# Patient Record
Sex: FEMALE | NOT HISPANIC OR LATINO | Employment: FULL TIME | URBAN - METROPOLITAN AREA
[De-identification: names, ages, dates, MRNs, and addresses within clinical notes are randomized per-mention and may not be internally consistent; named-entity substitution may affect disease eponyms.]

---

## 2023-03-03 LAB — CHORIOGONADOTROPIN (MIU/ML) IN SER/PLAS: <2 MIU/ML

## 2023-08-02 LAB — PROGESTERONE (NG/ML) IN SER/PLAS: 11.9 NG/ML

## 2023-08-12 LAB
ESTRADIOL (PG/ML) IN SER/PLAS: 31 PG/ML
HEMATOCRIT (%) IN BLOOD BY AUTOMATED COUNT: 38.4 % (ref 36–46)

## 2023-08-16 LAB — ESTRADIOL (PG/ML) IN SER/PLAS: 85 PG/ML

## 2023-08-19 LAB — ESTRADIOL (PG/ML) IN SER/PLAS: 427 PG/ML

## 2023-08-21 LAB
ESTRADIOL (PG/ML) IN SER/PLAS: 880 PG/ML
PROGESTERONE (NG/ML) IN SER/PLAS: 0.6 NG/ML

## 2023-09-21 LAB — PROGESTERONE (NG/ML) IN SER/PLAS: 5.7 NG/ML

## 2023-10-05 ENCOUNTER — TELEPHONE (OUTPATIENT)
Dept: ENDOCRINOLOGY | Facility: CLINIC | Age: 38
End: 2023-10-05
Payer: COMMERCIAL

## 2023-10-05 NOTE — TELEPHONE ENCOUNTER
RN returned patient's call and spoke with Ruth. He reports patient started her menses today. Patient also experiencing weight gain, fatigue and hot flashes. RN explained these are normal side effects for the protocol Nour is currently on. Patient's spouse reviewed the rest of the plan and verbalized understanding.   Yecenia Espinoza RN 10/05/23 2:47 PM

## 2023-10-16 ENCOUNTER — PHARMACY VISIT (OUTPATIENT)
Dept: PHARMACY | Facility: CLINIC | Age: 38
End: 2023-10-16
Payer: COMMERCIAL

## 2023-10-16 PROCEDURE — RXMED WILLOW AMBULATORY MEDICATION CHARGE

## 2023-10-17 ENCOUNTER — SPECIALTY PHARMACY (OUTPATIENT)
Dept: PHARMACY | Facility: CLINIC | Age: 38
End: 2023-10-17

## 2023-10-18 ENCOUNTER — TELEPHONE (OUTPATIENT)
Dept: ENDOCRINOLOGY | Facility: CLINIC | Age: 38
End: 2023-10-18
Payer: COMMERCIAL

## 2023-10-19 NOTE — TELEPHONE ENCOUNTER
Patient would like to come into clinic for her Lupron injection for endometrial suppression protocol. Patient will come into clinic on Tuesday 10/24 at 12:15 for her injection.   Yecenia Espinoza RN 10/19/23 12:03 PM

## 2023-10-21 PROBLEM — N97.0 ANOVULATION: Status: ACTIVE | Noted: 2023-10-21

## 2023-10-21 PROBLEM — D25.1 INTRAMURAL LEIOMYOMA OF UTERUS: Status: ACTIVE | Noted: 2023-10-21

## 2023-10-21 PROBLEM — R89.9 ABNORMAL LABORATORY TEST RESULT: Status: ACTIVE | Noted: 2023-10-21

## 2023-10-21 PROBLEM — D21.9 FIBROID: Status: ACTIVE | Noted: 2023-10-21

## 2023-10-21 PROBLEM — R19.00 ABDOMINAL MASS: Status: ACTIVE | Noted: 2023-10-21

## 2023-10-21 PROBLEM — E55.9 VITAMIN D DEFICIENCY: Status: ACTIVE | Noted: 2023-10-21

## 2023-10-21 PROBLEM — E03.9 ACQUIRED HYPOTHYROIDISM: Status: ACTIVE | Noted: 2023-10-21

## 2023-10-21 PROBLEM — N92.6 IRREGULAR MENSES: Status: ACTIVE | Noted: 2023-10-21

## 2023-10-21 PROBLEM — N80.9 ENDOMETRIOSIS: Status: ACTIVE | Noted: 2023-10-21

## 2023-10-21 PROBLEM — N97.9 FEMALE INFERTILITY: Status: ACTIVE | Noted: 2023-10-21

## 2023-10-21 RX ORDER — ESTRADIOL 2 MG/1
1 TABLET ORAL 2 TIMES DAILY
COMMUNITY
Start: 2022-10-14

## 2023-10-21 RX ORDER — CHLORHEXIDINE GLUCONATE ORAL RINSE 1.2 MG/ML
SOLUTION DENTAL
COMMUNITY
Start: 2022-11-24

## 2023-10-21 RX ORDER — LEVOTHYROXINE SODIUM 75 UG/1
75 TABLET ORAL EVERY MORNING
COMMUNITY
End: 2024-04-09

## 2023-10-21 RX ORDER — FOLLITROPIN 300 [IU]/.36ML
150 INJECTION, SOLUTION SUBCUTANEOUS DAILY
COMMUNITY
Start: 2022-10-14

## 2023-10-21 RX ORDER — PROPYLENE GLYCOL/PEG 400 0.3 %-0.4%
DROPS OPHTHALMIC (EYE)
COMMUNITY
End: 2023-11-09 | Stop reason: SDUPTHER

## 2023-10-21 RX ORDER — CLOMIPHENE CITRATE 50 MG/1
2 TABLET ORAL
COMMUNITY
Start: 2022-10-14

## 2023-10-21 RX ORDER — MENOTROPINS 75 UNIT
75 KIT SUBCUTANEOUS DAILY
COMMUNITY
Start: 2022-10-14

## 2023-10-21 RX ORDER — LIDOCAINE AND PRILOCAINE 25; 25 MG/G; MG/G
CREAM TOPICAL
COMMUNITY
Start: 2023-02-16 | End: 2023-11-09 | Stop reason: SDUPTHER

## 2023-10-21 RX ORDER — FOLIC ACID 1 MG/1
1 TABLET ORAL DAILY
COMMUNITY

## 2023-10-21 RX ORDER — CHOLECALCIFEROL (VITAMIN D3) 50 MCG
1 TABLET ORAL DAILY
COMMUNITY

## 2023-10-21 RX ORDER — LEVOTHYROXINE SODIUM 50 UG/1
TABLET ORAL
COMMUNITY
Start: 2022-10-19 | End: 2024-04-09 | Stop reason: DRUGHIGH

## 2023-10-21 RX ORDER — PROGESTERONE 50 MG/ML
75 INJECTION, SOLUTION INTRAMUSCULAR DAILY
COMMUNITY
Start: 2023-02-16 | End: 2023-11-09 | Stop reason: SDUPTHER

## 2023-10-23 ENCOUNTER — SPECIALTY PHARMACY (OUTPATIENT)
Dept: PHARMACY | Facility: CLINIC | Age: 38
End: 2023-10-23

## 2023-10-24 ENCOUNTER — ANCILLARY PROCEDURE (OUTPATIENT)
Dept: ENDOCRINOLOGY | Facility: CLINIC | Age: 38
End: 2023-10-24
Payer: COMMERCIAL

## 2023-10-24 DIAGNOSIS — N80.9 ENDOMETRIOSIS: Primary | ICD-10-CM

## 2023-10-24 PROCEDURE — 2500000004 HC RX 250 GENERAL PHARMACY W/ HCPCS (ALT 636 FOR OP/ED): Mod: JZ,SE | Performed by: NURSE PRACTITIONER

## 2023-10-24 RX ADMIN — LEUPROLIDE ACETATE 3.75 MG: KIT at 12:22

## 2023-10-24 NOTE — PROGRESS NOTES
Pt brought medication to office for RN to administer IM injection. Order placed for in office IM injection. RN to be sure NEGATIVE UPT in office today prior to administration.   Reviewed and approved by KAT PITTS on 10/24/23 at 12:09 PM.     IM Depo Lupron given in office today. Patient brought own medication from home. See med administration for details. This is patient's second dose, therefore no need for a UPT in the office today per MAXWELL Pitts. NP. Patient tolerated injection well.  Nesha Huerta, RN

## 2023-11-09 ENCOUNTER — TELEPHONE (OUTPATIENT)
Dept: ENDOCRINOLOGY | Facility: CLINIC | Age: 38
End: 2023-11-09
Payer: COMMERCIAL

## 2023-11-09 DIAGNOSIS — N97.9 FEMALE INFERTILITY: ICD-10-CM

## 2023-11-09 DIAGNOSIS — Z31.83 ENCOUNTER FOR ASSISTED REPRODUCTIVE FERTILITY CYCLE: ICD-10-CM

## 2023-11-09 NOTE — TELEPHONE ENCOUNTER
Spoke to patient's  and told him that per Dr. Mendoza, patient my stop the suppression tabs a few days early.  She will plan on taking last tablets on 11/16 and start Estrace 6 mg daily on 11/17 and will still plan on tentative FET date on 12/12 with lining check on 12/4.  Will pend orders / meds to Dr. Mendoza now.  Patient verbalized understanding.   Kim Malik RN 11/09/23 4:31 PM      Erin Mendoza MD  You28 minutes ago (3:56 PM)         Yes that would be fine with me, thanks     You  Erin Mendoza MD1 hour ago (2:36 PM)       Toshia called- he said that Kamla is very depressed/angry and in general not feeling well on the suppression.  He would like to know if they could start the Estrace and stop the Aygestin/Letrozole a few days early?

## 2023-11-10 ENCOUNTER — TELEPHONE (OUTPATIENT)
Dept: ENDOCRINOLOGY | Facility: CLINIC | Age: 38
End: 2023-11-10
Payer: COMMERCIAL

## 2023-11-10 ENCOUNTER — PHARMACY VISIT (OUTPATIENT)
Dept: PHARMACY | Facility: CLINIC | Age: 38
End: 2023-11-10
Payer: COMMERCIAL

## 2023-11-10 DIAGNOSIS — N97.9 FEMALE INFERTILITY: ICD-10-CM

## 2023-11-10 PROCEDURE — RXMED WILLOW AMBULATORY MEDICATION CHARGE

## 2023-11-10 RX ORDER — PROPYLENE GLYCOL/PEG 400 0.3 %-0.4%
30 DROPS OPHTHALMIC (EYE) DAILY
Qty: 30 EACH | Refills: 2 | Status: SHIPPED | OUTPATIENT
Start: 2023-11-10

## 2023-11-10 RX ORDER — PROGESTERONE 50 MG/ML
75 INJECTION, SOLUTION INTRAMUSCULAR DAILY
Qty: 4 EACH | Refills: 2 | Status: SHIPPED | OUTPATIENT
Start: 2023-11-10

## 2023-11-10 RX ORDER — LIDOCAINE AND PRILOCAINE 25; 25 MG/G; MG/G
CREAM TOPICAL
Qty: 30 G | Refills: 2 | Status: SHIPPED | OUTPATIENT
Start: 2023-11-10

## 2023-11-10 RX ORDER — LIDOCAINE AND PRILOCAINE 25; 25 MG/G; MG/G
CREAM TOPICAL
Qty: 30 G | Refills: 2 | Status: SHIPPED | OUTPATIENT
Start: 2023-11-10 | End: 2023-12-09

## 2023-11-10 RX ORDER — ESTRADIOL 2 MG/1
6 TABLET ORAL DAILY
Qty: 90 TABLET | Refills: 2 | Status: SHIPPED | OUTPATIENT
Start: 2023-11-10 | End: 2023-11-10 | Stop reason: SDUPTHER

## 2023-11-10 RX ORDER — PROGESTERONE 50 MG/ML
75 INJECTION, SOLUTION INTRAMUSCULAR DAILY
Qty: 5 EACH | Refills: 2 | Status: SHIPPED | OUTPATIENT
Start: 2023-11-10 | End: 2023-11-10 | Stop reason: SDUPTHER

## 2023-11-10 RX ORDER — ESTRADIOL 2 MG/1
6 TABLET ORAL DAILY
Qty: 90 TABLET | Refills: 2 | Status: SHIPPED | OUTPATIENT
Start: 2023-11-10 | End: 2024-02-08

## 2023-11-10 RX ORDER — PROGESTERONE 50 MG/ML
75 INJECTION, SOLUTION INTRAMUSCULAR DAILY
Qty: 50 ML | Refills: 2 | Status: SHIPPED | OUTPATIENT
Start: 2023-11-10 | End: 2024-02-08

## 2023-11-10 RX ORDER — LIDOCAINE AND PRILOCAINE 25; 25 MG/G; MG/G
CREAM TOPICAL
Qty: 1 G | Refills: 2 | Status: SHIPPED | OUTPATIENT
Start: 2023-11-10 | End: 2023-11-10 | Stop reason: SDUPTHER

## 2023-11-10 RX ORDER — PROPYLENE GLYCOL/PEG 400 0.3 %-0.4%
30 DROPS OPHTHALMIC (EYE) DAILY
Qty: 30 EACH | Refills: 2 | Status: SHIPPED | OUTPATIENT
Start: 2023-11-10 | End: 2023-11-10 | Stop reason: SDUPTHER

## 2023-11-10 RX ORDER — PROPYLENE GLYCOL/PEG 400 0.3 %-0.4%
DROPS OPHTHALMIC (EYE)
Qty: 30 EACH | Refills: 3 | Status: SHIPPED | OUTPATIENT
Start: 2023-11-10 | End: 2024-02-07

## 2023-11-10 NOTE — TELEPHONE ENCOUNTER
Pharmacy needs clarification as they received duplicates of the scripts; lidocaine cream, transparent dressing, progesterone oil, estradiol

## 2023-11-10 NOTE — TELEPHONE ENCOUNTER
Contacted Cristy Huddleston from [facility] and requested reroute of all FET meds to [facility].  Kim Malik RN 11/10/23 2:48 PM

## 2023-11-13 ENCOUNTER — SPECIALTY PHARMACY (OUTPATIENT)
Dept: PHARMACY | Facility: CLINIC | Age: 38
End: 2023-11-13

## 2023-11-17 NOTE — PROGRESS NOTES
FET set up after suppression protocol.     LMP: s/p suppression protocol  Plan: Programmed FET with TERI 75 mg and baby ASA 81 mg to start with FET   Tentative lining check: 12/4/23  Tentative progesterone start: 12/7/23  Tentative transfer date: 12/12/23  Number of days of progesterone for transfer: 6  Treatment plan: Sent 11/17/23  Embryo # confirmed: 1 SC   Embryo # to transfer: 1     Reviewed and approved by JEANA CARMONA on 11/17/23 at 1:19 PM.    Called patient's , Ruth, to verify that Kamla will start her estrace tablets today (3 tabs daily) and also informed him that they will need to complete the FET treatment plan ASAP via [employer].  Patient verbalized understanding.   Kim Malik RN 11/17/23 2:40 PM

## 2023-12-04 ENCOUNTER — ANCILLARY PROCEDURE (OUTPATIENT)
Dept: ENDOCRINOLOGY | Facility: CLINIC | Age: 38
End: 2023-12-04
Payer: COMMERCIAL

## 2023-12-04 DIAGNOSIS — N97.9 FEMALE INFERTILITY: ICD-10-CM

## 2023-12-04 LAB
ESTRADIOL SERPL-MCNC: 277 PG/ML
PROGEST SERPL-MCNC: 0.5 NG/ML

## 2023-12-04 PROCEDURE — 84144 ASSAY OF PROGESTERONE: CPT

## 2023-12-04 PROCEDURE — 36415 COLL VENOUS BLD VENIPUNCTURE: CPT

## 2023-12-04 PROCEDURE — 76857 US EXAM PELVIC LIMITED: CPT | Performed by: OBSTETRICS & GYNECOLOGY

## 2023-12-04 PROCEDURE — 76857 US EXAM PELVIC LIMITED: CPT

## 2023-12-04 PROCEDURE — 82670 ASSAY OF TOTAL ESTRADIOL: CPT

## 2023-12-04 RX ORDER — ESTRADIOL 2 MG/1
2 TABLET ORAL 2 TIMES DAILY
Qty: 60 TABLET | Refills: 3
Start: 2023-12-04 | End: 2024-12-03

## 2023-12-04 NOTE — PROGRESS NOTES
Lining Check    Patient reviewed at team meeting to set up frozen embryo transfer.  Medication protocol in LUIZ cycles tab  Lining check:  12/4/2023  RN reviewed TERI administration, marked patient's sites and provided handout.   TERI start: 12/7/2023  Tentative FET date:  12/12/2023  Plan to be communicated to patient by treatment team.     Yecenia Espinoza  12/04/2023  9:41 AM    [facility]  Ultrasound and/or labs reviewed at [facility].   Results for orders placed or performed in visit on 12/04/23 (from the past 96 hour(s))   Estradiol   Result Value Ref Range    Estradiol 277 pg/mL   Progesterone   Result Value Ref Range    Progesterone 0.5 ng/mL     Mon 12/4 (Day 18)   estradiol tablet: Take 6 mg once daily by mouth   estradiol tablet: Insert 2 mg 2 times a day into the vagina    Tue 12/5 (Day 19)   estradiol tablet: Take 6 mg once daily by mouth   estradiol tablet: Insert 2 mg 2 times a day into the vagina    RTC in two days for Lining check and Estradiol and Progesterone.   Erin Mendoza  12/04/2023  1:16 PM    RN called patient with her plan and spoke with her  Qian. RN reviewed that patient will continue to take estradiol 6mg PO daily, but patient will add estradiol 2mg BID vaginally. Patient's  verbalized understanding. Patient will return to clinic on Wednesday for a second lining check. RN transferred call to the  so patient can schedule for Wednesday.   Yecenia Espinoza RN 12/04/23 1:49 PM

## 2023-12-06 ENCOUNTER — ANCILLARY PROCEDURE (OUTPATIENT)
Dept: ENDOCRINOLOGY | Facility: CLINIC | Age: 38
End: 2023-12-06
Payer: COMMERCIAL

## 2023-12-06 DIAGNOSIS — N97.9 FEMALE INFERTILITY: ICD-10-CM

## 2023-12-06 LAB
ESTRADIOL SERPL-MCNC: 511 PG/ML
PROGEST SERPL-MCNC: 0.4 NG/ML

## 2023-12-06 PROCEDURE — 84144 ASSAY OF PROGESTERONE: CPT

## 2023-12-06 PROCEDURE — 76857 US EXAM PELVIC LIMITED: CPT

## 2023-12-06 PROCEDURE — 82670 ASSAY OF TOTAL ESTRADIOL: CPT

## 2023-12-06 PROCEDURE — 36415 COLL VENOUS BLD VENIPUNCTURE: CPT

## 2023-12-06 NOTE — PROGRESS NOTES
CYCLING NOTE    Here for US and/or lab monitoring; relevant findings reviewed. Patient is here for second lining check for programmed FET. Had added PV estrace on Monday, returning today for re-check. Patient to start progesterone injections 75mg tomorrow 12/7. Patient to start 81mg ASA with embryo transfer on 12/12.    Patient stayed for nurse visit. Pain is 0/10 -- Progesterone teaching already completed on Monday.   Team will contact patient later today with results and plan.    BRANDON DE LA PAZ  12/06/2023  9:15 AM      HUDDLE PROVIDER NOTE  Ultrasound and/or labs reviewed at [facility].   Results for orders placed or performed in visit on 12/06/23 (from the past 96 hour(s))   Estradiol   Result Value Ref Range    Estradiol 511 pg/mL   Progesterone   Result Value Ref Range    Progesterone 0.4 ng/mL     Wed 12/6 (Day 20)   estradiol tablet: Take 6 mg once daily by mouth   estradiol tablet: Insert 2 mg 2 times a day into the vagina    Thu 12/7 (Day 21)   progesterone injection: Inject 75 mg once daily into the muscle   estradiol tablet: Take 6 mg once daily by mouth    Fri 12/8 (Day 22)   progesterone injection: Inject 75 mg once daily into the muscle   estradiol tablet: Take 6 mg once daily by mouth    Sat 12/9 (Day 23)   progesterone injection: Inject 75 mg once daily into the muscle   estradiol tablet: Take 6 mg once daily by mouth    Sun 12/10 (Day 24)   progesterone injection: Inject 75 mg once daily into the muscle   estradiol tablet: Take 6 mg once daily by mouth    Mon 12/11 (Day 25)   progesterone injection: Inject 75 mg once daily into the muscle   estradiol tablet: Take 6 mg once daily by mouth    Tue 12/12 (Day 26)   progesterone injection: Inject 75 mg once daily into the muscle   estradiol tablet: Take 6 mg once daily by mouth    RTC in  12/12/2023  for  embryo transfer  and Progesterone.   Erin Mendoza  12/06/2023  1:00 PM      Placed call to patient/ patient's  to relay plan as listed  above. Patient is aware to discontinue vaginal estrace after today when she starts the progesterone injections tomorrow. Relayed that patient is to start 81mg ASA with transfer as well. Patient verbalized understanding of plan, they are aware that the lab will confirm what time to be here for the transfer the day before. No further questions at this time.   BRANDON DE LA PAZ RN 12/06/2023 13:55

## 2023-12-12 ENCOUNTER — PREP FOR PROCEDURE (OUTPATIENT)
Dept: ENDOCRINOLOGY | Facility: CLINIC | Age: 38
End: 2023-12-12
Payer: COMMERCIAL

## 2023-12-12 ENCOUNTER — HOSPITAL ENCOUNTER (OUTPATIENT)
Dept: ENDOCRINOLOGY | Facility: CLINIC | Age: 38
Discharge: HOME | End: 2023-12-12
Payer: COMMERCIAL

## 2023-12-12 ENCOUNTER — TELEPHONE (OUTPATIENT)
Dept: ENDOCRINOLOGY | Facility: CLINIC | Age: 38
End: 2023-12-12
Payer: COMMERCIAL

## 2023-12-12 DIAGNOSIS — Z31.83 ENCOUNTER FOR ASSISTED REPRODUCTIVE FERTILITY CYCLE: ICD-10-CM

## 2023-12-12 DIAGNOSIS — N97.9 FEMALE INFERTILITY: ICD-10-CM

## 2023-12-12 DIAGNOSIS — Z32.00 UNCONFIRMED PREGNANCY: Primary | ICD-10-CM

## 2023-12-12 LAB — PROGEST SERPL-MCNC: 39 NG/ML

## 2023-12-12 PROCEDURE — 89255 PREPARE EMBRYO FOR TRANSFER: CPT | Performed by: OBSTETRICS & GYNECOLOGY

## 2023-12-12 PROCEDURE — 89253 EMBRYO HATCHING: CPT | Performed by: OBSTETRICS & GYNECOLOGY

## 2023-12-12 PROCEDURE — 84144 ASSAY OF PROGESTERONE: CPT | Performed by: OBSTETRICS & GYNECOLOGY

## 2023-12-12 PROCEDURE — 89352 THAWING CRYOPRESRVED EMBRYO: CPT | Performed by: OBSTETRICS & GYNECOLOGY

## 2023-12-12 PROCEDURE — 36415 COLL VENOUS BLD VENIPUNCTURE: CPT | Performed by: OBSTETRICS & GYNECOLOGY

## 2023-12-12 PROCEDURE — 58974 EMBRYO TRANSFER INTRAUTERINE: CPT | Performed by: OBSTETRICS & GYNECOLOGY

## 2023-12-12 PROCEDURE — 36415 COLL VENOUS BLD VENIPUNCTURE: CPT

## 2023-12-12 PROCEDURE — 84144 ASSAY OF PROGESTERONE: CPT

## 2023-12-12 RX ORDER — ACETAMINOPHEN 325 MG/1
650 TABLET ORAL ONCE AS NEEDED
Status: DISCONTINUED | OUTPATIENT
Start: 2023-12-12 | End: 2023-12-13 | Stop reason: HOSPADM

## 2023-12-12 RX ORDER — ACETAMINOPHEN 325 MG/1
650 TABLET ORAL ONCE AS NEEDED
Status: CANCELLED | OUTPATIENT
Start: 2023-12-12

## 2023-12-12 NOTE — DISCHARGE INSTRUCTIONS
[facility]  [address]. [place]  [address]  Tel: [phone]   Fax: [phone]    Home Going Instructions after Embryo Transfer:     After completing your embryo transfer please treat your body as though you are pregnant.  No smoking, alcohol, or recreational drugs.  Make sure you are taking a prenatal vitamin daily.   Continue all medications currently prescribed for embryo transfer until otherwise instructed by your physician, even if you experience spotting or bleeding and even if you have a negative home pregnancy test.   Medications to avoid in pregnancy: Advil, Motrin, Ibuprofen, Aleve, Excedrin, [place], Sudafed, and Pepto-Bismol. Avoid aspirin unless you are taking this daily at the direction of your physician.   Medications that are generally safe in pregnancy: Tylenol, Benadryl, Plain Robitussin, Zyrtec, Claritin, Pepcid, Tums, Rolaids, Mylanta, Nasonex.   Foods to avoid:   Seafood that is high in mercury; you can have canned tuna twice a week.   Deli meats, deli salads, hot dogs, and unpasteurized cheeses due to the risk of Listeria.  Activities to avoid:   No hot tubs, whirlpools, or saunas.  Avoid starting new exercise routines that you have not done previously.  Avoid lifting > 30 lbs.  No cleaning litter boxes.  No intercourse until you test for pregnancy.   You do not need to be on bed rest following the transfer. It is healthy, normal, and recommended to go about routine physical activity.   We advise against taking a home pregnancy test due to the possibility of false negative and false positive results.   You will test for pregnancy in approximately 10 days, at which point you will be about 4 weeks pregnant.   If blood work was drawn on the day of your transfer, you can expect a phone that day with the results of your bloodwork. We expect a progesterone level greater than or equal to 16. If you level is less than 16  we will adjust your progesterone dose and repeat your level in 2 days.     Rashmi Dyson    11:02 AM

## 2023-12-12 NOTE — TELEPHONE ENCOUNTER
Spoke with patient regarding level today at 13:49, told to continue all medications as ordered per Dr. Mendoza.  Patient was understanding and is agreeable.  Progesterone   Date Value Ref Range Status   12/12/2023 39.0 ng/mL Final       Rashmi Dyson RN

## 2023-12-12 NOTE — PROGRESS NOTES
Patient ID: Kamla Cota is a 38 y.o. female.    Embryo Transfer    Date/Time: 12/12/2023 12:33 PM    Performed by: Erin Mendoza MD  Authorized by: Erin Mendoza MD    Consent:     Consent obtained:  Written    Consent given by:  Patient    Procedure risks and benefits discussed: yes      Patient questions answered: yes      Patient agrees, verbalizes understanding, and wants to proceed: yes      Educational handouts given: yes      Instructions and paperwork completed: yes    Procedure:     Pelvic exam performed: No      Cervix cleaned and prepped: Yes      Speculum placed in vagina: Yes      Ultrasound guidance: Yes      Speculum type: Nolvia (long)      Catheter type:  Sure View Reese    Uterine position:  Retroverted    Bladder backfilling performed: No, bladder sufficiently full      Uterine visualization complete: Yes      Difficulty: easy      Estimated blood loss:  None    Embryos transferred:  1    Catheter navigation notes:  23 cm catheter easy with afterload  Used long nolvia speculum to find cervix- very anterior  Post-procedure:     Patient tolerated procedure well: yes    Comments:      Preop diagnosis: Infertility  Post op diagnosis: Same  Assistant: none  Depth: ~15 cm  Curve: 30 deg  Distance from fundus: 14 mm  Embryo stage at day of transfer: day 6  Embryo notes: BB   Additional Notes:  Anesthesia: None    IV Fluids: None  UOP: Not recorded  Specimens: None  Complications: None  This replaces an operative report.

## 2023-12-14 ENCOUNTER — PHARMACY VISIT (OUTPATIENT)
Dept: PHARMACY | Facility: CLINIC | Age: 38
End: 2023-12-14
Payer: MEDICAID

## 2023-12-14 ENCOUNTER — SPECIALTY PHARMACY (OUTPATIENT)
Dept: PHARMACY | Facility: CLINIC | Age: 38
End: 2023-12-14

## 2023-12-14 PROCEDURE — RXMED WILLOW AMBULATORY MEDICATION CHARGE

## 2023-12-22 ENCOUNTER — ANCILLARY PROCEDURE (OUTPATIENT)
Dept: ENDOCRINOLOGY | Facility: CLINIC | Age: 38
End: 2023-12-22
Payer: COMMERCIAL

## 2023-12-22 DIAGNOSIS — E03.8 OTHER SPECIFIED HYPOTHYROIDISM: Primary | ICD-10-CM

## 2023-12-22 DIAGNOSIS — Z32.00 UNCONFIRMED PREGNANCY: ICD-10-CM

## 2023-12-22 LAB — B-HCG SERPL-ACNC: 49 MIU/ML

## 2023-12-22 PROCEDURE — 84702 CHORIONIC GONADOTROPIN TEST: CPT

## 2023-12-22 PROCEDURE — 36415 COLL VENOUS BLD VENIPUNCTURE: CPT

## 2023-12-22 NOTE — PROGRESS NOTES
Component      Latest Ref Rng 2023   HCG, Beta-Quantitative      <5 mIU/mL 49 (H)         HCG s/p FET on 23.  Kim Malik RN 23 10:28 AM      Patient's LUI[facility] Provider: Erin Mendoza MD  Infertility dx: Male infertility, History of Endometriosis, and Uterine  Achieved pregnancy by: Embryo transfer  EGA based on (IUI date, ET ): embryo transfer date 4w 1d based on FET of     Updated G/P with this pregnancy:       Type & Screen: A+ Ab NEG  History of miscarriage: No  History of pelvic/abdominal surgeries: No  History of STDs/PID: No  Hx of ectopic: No  Hx of tubal disease/ blockage: No    Risk for ectopic:     Labs ordered/Plan:   CG ordered. Team will reach out to patient with results and plan.   Discussed early pregnancy versus miscarriage versus ectopic. Precautions given.   Patient expresses understanding of plan and is agreeable.    Kim Malik  2023  12:02 PM    HUDDLE PROVIDER NOTE - HCG REVIEW  [facility]     Results for orders placed or performed in visit on 23 (from the past 96 hour(s))   hCG, quantitative, pregnancy   Result Value Ref Range    HCG, Beta-Quantitative 49 (H) <5 mIU/mL       Lab Results   Component Value Date    HCGQUANT 49 (H) 2023    HCGQUANT <2 2023       RTC in four days for LABS ONLY VISIT and HCG.   Mazin Padilla  2023  12:36 PM    Discussed HCG level with patient, will recheck Tuesday along with a TSH as he states she is on Synthroid, she will continue all FET meds plus baby ASA daily.  Discussed that this level is lower than what we would like to see at this stage in pregnancy.  Kim Malik RN 23 1:25 PM

## 2023-12-26 ENCOUNTER — ANCILLARY PROCEDURE (OUTPATIENT)
Dept: ENDOCRINOLOGY | Facility: CLINIC | Age: 38
End: 2023-12-26
Payer: COMMERCIAL

## 2023-12-26 ENCOUNTER — TELEPHONE (OUTPATIENT)
Dept: ENDOCRINOLOGY | Facility: CLINIC | Age: 38
End: 2023-12-26

## 2023-12-26 DIAGNOSIS — E03.8 OTHER SPECIFIED HYPOTHYROIDISM: ICD-10-CM

## 2023-12-26 DIAGNOSIS — Z32.00 UNCONFIRMED PREGNANCY: ICD-10-CM

## 2023-12-26 DIAGNOSIS — O36.80X0 ENCOUNTER TO DETERMINE FETAL VIABILITY OF PREGNANCY, NOT APPLICABLE OR UNSPECIFIED FETUS (HHS-HCC): ICD-10-CM

## 2023-12-26 LAB
B-HCG SERPL-ACNC: 83 MIU/ML
TSH SERPL-ACNC: 2.78 MIU/L (ref 0.44–3.98)

## 2023-12-26 PROCEDURE — 84443 ASSAY THYROID STIM HORMONE: CPT

## 2023-12-26 PROCEDURE — 84702 CHORIONIC GONADOTROPIN TEST: CPT

## 2023-12-26 PROCEDURE — 36415 COLL VENOUS BLD VENIPUNCTURE: CPT

## 2023-12-26 NOTE — PROGRESS NOTES
Repeat BHCG. EGA based on FET date 23: 4w5d today  Component      Latest Ref Rng 2023   HCG, Beta-Quantitative      <5 mIU/mL 49 (H)  83 (H)       Component      Latest Ref Rng 2023   Thyroid Stimulating Hormone      0.44 - 3.98 mIU/L 2.78      HCG s/p FET on 23.       Patient's [facility] Provider: Erin Mendoza MD  Infertility dx: Male infertility, History of Endometriosis, and Uterine  Achieved pregnancy by: Embryo transfer     Updated G/P with this pregnancy:       Type & Screen: A+ Ab NEG  History of miscarriage: No  History of pelvic/abdominal surgeries: No  History of STDs/PID: No  Hx of ectopic: No  Hx of tubal disease/ blockage: No     Risk for ectopic: no     Will call pt with results and plan.    ALL VASQUES on 23 at 1:01 PM.     [facility] PROVIDER NOTE - HCG REVIEW  Ultrasound and/or labs reviewed at [facility].     Results for orders placed or performed in visit on 23 (from the past 96 hour(s))   (Lab Collect) Human Chorionic Gonadotropin, Serum Quantitative   Result Value Ref Range    HCG, Beta-Quantitative 83 (H) <5 mIU/mL   TSH with reflex to Free T4 if abnormal   Result Value Ref Range    Thyroid Stimulating Hormone 2.78 0.44 - 3.98 mIU/L       Lab Results   Component Value Date    HCGQUANT 83 (H) 2023    HCGQUANT 49 (H) 2023    HCGQUANT <2 2023    HCGQUANT 9,581 (A) 2019    HCGQUANT 525 (A) 2019    HCGQUANT <2 2019       RTC in two days for LABS ONLY VISIT and HCG.   Erin Mendoza  2023  1:11 PM    Called pts  Qian to discuss results and plan. WE reviewed the minimum rise for BHCG levels is 53% in 48 hours. HE states he thinks we checked it too early and should be 14 days from FET. I explained timing and GA of pregnancy with IUI/ovulation/LMP vs FET. We discussed the plan for now is to continue all meds at current doses and repeat bhcg on , however, if bhcg level  does not rise minimum,  we will likely discontinue meds as it will mean the pregnancy is not viable. We reviewed all meds and dosages she is on. WE discussed that TSH=2.78, just slightly above where we would like it, and he confirmed that Kamla takes 75 mcg levothyroxine daily. I told him we will discuss results 12/28 and based on bhcg level at that time decide if she needs to increase levothyroxine dose. He verbalized understanding and is agreeable.   ALL VASQUES on 12/26/23 at 2:37 PM.

## 2023-12-28 ENCOUNTER — ANCILLARY PROCEDURE (OUTPATIENT)
Dept: ENDOCRINOLOGY | Facility: CLINIC | Age: 38
End: 2023-12-28
Payer: COMMERCIAL

## 2023-12-28 ENCOUNTER — DOCUMENTATION (OUTPATIENT)
Dept: ENDOCRINOLOGY | Facility: CLINIC | Age: 38
End: 2023-12-28

## 2023-12-28 DIAGNOSIS — O02.81 INAPPROPRIATE CHANGE IN QUANTITATIVE HCG IN EARLY PREGNANCY (HHS-HCC): Primary | ICD-10-CM

## 2023-12-28 DIAGNOSIS — O36.80X0 ENCOUNTER TO DETERMINE FETAL VIABILITY OF PREGNANCY, NOT APPLICABLE OR UNSPECIFIED FETUS (HHS-HCC): ICD-10-CM

## 2023-12-28 LAB — B-HCG SERPL-ACNC: 89 MIU/ML

## 2023-12-28 PROCEDURE — 36415 COLL VENOUS BLD VENIPUNCTURE: CPT

## 2023-12-28 PROCEDURE — 84702 CHORIONIC GONADOTROPIN TEST: CPT

## 2023-12-28 NOTE — PROGRESS NOTES
Component      Latest Ref Rng 12/22/2023 12/26/2023 12/28/2023   HCG, Beta-Quantitative      <5 mIU/mL 49 (H)  83 (H)  89 (H)        Here for lab monitoring, patient and  have questions re: HCG levels.  All questions answered, they are aware that HCG levels thus far are not appropriate, they are hopeful.      Patient stayed for nurse visit. Pain is 0/10  Team will contact patient later today with results and plan.    Kim Malik  12/28/2023  10:08 AM    HUDDLE PROVIDER NOTE - HCG REVIEW  Ultrasound and/or labs reviewed at [facility].     Results for orders placed or performed in visit on 12/28/23 (from the past 96 hour(s))   (Lab Collect) Human Chorionic Gonadotropin, Serum Quantitative   Result Value Ref Range    HCG, Beta-Quantitative 89 (H) <5 mIU/mL       Lab Results   Component Value Date    HCGQUANT 89 (H) 12/28/2023    HCGQUANT 83 (H) 12/26/2023    HCGQUANT 49 (H) 12/22/2023    HCGQUANT <2 03/03/2023    HCGQUANT 9,581 (A) 09/11/2019    HCGQUANT 525 (A) 09/04/2019    HCGQUANT <2 08/02/2019       Stop  progesterone and estrace and  RTC in two days for LABS ONLY VISIT and HCG.   Erin Mendoza  12/28/2023  12:44 PM    Spoke to patient's  Lulu, he was instructed to have Nour stop all medicines and go to [facility] lab on Saturday morning at 0800 for repeat level.  Ectopic and bleeding precautions given.  He verbalized understanding.  He was encouraged to call office and have answering service page fellow if needed.  He would also like to speak w Dr. Mendoza today- will send her a message.  Kim Malik RN 12/28/23 1:03 PM

## 2023-12-28 NOTE — PROGRESS NOTES
Called patient and  to review biochemical pregnancy after FET.    She did IVF cycle and had 1 blast frozen    FET--> hCG rising abnormally, will stop meds and come for repeat level in 2 days    We reviewed the cycle and recommendations for proceeding  They will likely not pursue further IVF treatments at this toma.  Offered follow up visit if patient desires.    JEANA CARMONA on 12/28/23 at 4:46 PM.

## 2023-12-30 ENCOUNTER — ANCILLARY PROCEDURE (OUTPATIENT)
Dept: ENDOCRINOLOGY | Facility: CLINIC | Age: 38
End: 2023-12-30
Payer: COMMERCIAL

## 2023-12-30 ENCOUNTER — LAB (OUTPATIENT)
Dept: LAB | Facility: LAB | Age: 38
End: 2023-12-30
Payer: COMMERCIAL

## 2023-12-30 DIAGNOSIS — O02.81 BIOCHEMICAL PREGNANCY (HHS-HCC): Primary | ICD-10-CM

## 2023-12-30 DIAGNOSIS — N97.9 FEMALE INFERTILITY: ICD-10-CM

## 2023-12-30 DIAGNOSIS — O02.81 INAPPROPRIATE CHANGE IN QUANTITATIVE HCG IN EARLY PREGNANCY (HHS-HCC): ICD-10-CM

## 2023-12-30 LAB
B-HCG SERPL-ACNC: 68 MIU/ML
ESTRADIOL SERPL-MCNC: 127 PG/ML

## 2023-12-30 PROCEDURE — 36415 COLL VENOUS BLD VENIPUNCTURE: CPT

## 2023-12-30 PROCEDURE — 82670 ASSAY OF TOTAL ESTRADIOL: CPT

## 2023-12-30 PROCEDURE — 84702 CHORIONIC GONADOTROPIN TEST: CPT

## 2023-12-30 NOTE — PROGRESS NOTES
[facility]DLE PROVIDER NOTE  [facility]   Results for orders placed or performed in visit on 12/28/23 (from the past 96 hour(s))   (Lab Collect) Human Chorionic Gonadotropin, Serum Quantitative   Result Value Ref Range    HCG, Beta-Quantitative 89 (H) <5 mIU/mL     Repeat hCG today resulted at 68, down from 89. Called patient and discussed plan with . He will call on Tuesday to schedule blood draw the following week (1/9/24).    RTC in TEN DAYS for LABS ONLY VISIT and HCG.   Discussed with Dr. Alondra Acuna  12/30/2023  11:37 AM

## 2024-01-09 ENCOUNTER — ANCILLARY PROCEDURE (OUTPATIENT)
Dept: ENDOCRINOLOGY | Facility: CLINIC | Age: 39
End: 2024-01-09
Payer: COMMERCIAL

## 2024-01-09 DIAGNOSIS — O02.81 BIOCHEMICAL PREGNANCY (HHS-HCC): ICD-10-CM

## 2024-01-09 LAB — B-HCG SERPL-ACNC: <2 MIU/ML

## 2024-01-09 PROCEDURE — 84702 CHORIONIC GONADOTROPIN TEST: CPT

## 2024-01-09 PROCEDURE — 36415 COLL VENOUS BLD VENIPUNCTURE: CPT

## 2024-01-09 NOTE — PROGRESS NOTES
Patient here for repeat HCG today. Is s/p FET on 12/12/23 with biochemical pregnancy.  ANGEL BLANCO on 1/9/24 at 10:48 AM.       [facility]  Ultrasound and/or labs reviewed at [facility] [place] [facility].     Results for orders placed or performed in visit on 01/09/24 (from the past 96 hour(s))   hCG, quantitative, pregnancy   Result Value Ref Range    HCG, Beta-Quantitative <2 <5 mIU/mL       Lab Results   Component Value Date    HCGQUANT <2 01/09/2024    HCGQUANT 68 (H) 12/30/2023    HCGQUANT 89 (H) 12/28/2023    HCGQUANT 83 (H) 12/26/2023    HCGQUANT 49 (H) 12/22/2023    HCGQUANT <2 03/03/2023    HCGQUANT 9,581 (A) 09/11/2019    HCGQUANT 525 (A) 09/04/2019       Can stop testing as hCG is negative.   Erin Mendoza  01/09/2024  1:44 PM    Phone call to patient's , reviewed plan as noted above, he verbalizes understanding at this time and denies additional questions.  ANGEL BLANCO on 1/9/24 at 1:58 PM.

## 2024-04-09 DIAGNOSIS — E03.9 ACQUIRED HYPOTHYROIDISM: Primary | ICD-10-CM

## 2024-04-09 RX ORDER — LEVOTHYROXINE SODIUM 75 UG/1
75 TABLET ORAL
Qty: 90 TABLET | Refills: 3 | Status: SHIPPED | OUTPATIENT
Start: 2024-04-09

## 2024-04-09 NOTE — TELEPHONE ENCOUNTER
Pt last seen 04/26/2023 / Spoke to pt  Annual sched for 07/23/2024 / last tsh 12/26/2023 results 2.78   wanting to know if tsg needs to be done prior to 7/2024 appt .

## 2024-07-23 ENCOUNTER — HOSPITAL ENCOUNTER (OUTPATIENT)
Dept: RADIOLOGY | Facility: CLINIC | Age: 39
Discharge: HOME | End: 2024-07-23
Payer: COMMERCIAL

## 2024-07-23 ENCOUNTER — LAB (OUTPATIENT)
Dept: LAB | Facility: LAB | Age: 39
End: 2024-07-23
Payer: COMMERCIAL

## 2024-07-23 ENCOUNTER — OFFICE VISIT (OUTPATIENT)
Dept: OBSTETRICS AND GYNECOLOGY | Facility: CLINIC | Age: 39
End: 2024-07-23
Payer: COMMERCIAL

## 2024-07-23 VITALS
BODY MASS INDEX: 32.94 KG/M2 | WEIGHT: 179 LBS | DIASTOLIC BLOOD PRESSURE: 72 MMHG | HEIGHT: 62 IN | SYSTOLIC BLOOD PRESSURE: 122 MMHG

## 2024-07-23 VITALS — BODY MASS INDEX: 32.94 KG/M2 | WEIGHT: 179 LBS | HEIGHT: 62 IN

## 2024-07-23 DIAGNOSIS — Z01.419 VISIT FOR GYNECOLOGIC EXAMINATION: Primary | ICD-10-CM

## 2024-07-23 DIAGNOSIS — E03.9 ACQUIRED HYPOTHYROIDISM: ICD-10-CM

## 2024-07-23 DIAGNOSIS — Z12.31 ENCOUNTER FOR SCREENING MAMMOGRAM FOR MALIGNANT NEOPLASM OF BREAST: ICD-10-CM

## 2024-07-23 DIAGNOSIS — D21.9 FIBROID: ICD-10-CM

## 2024-07-23 LAB
T4 FREE SERPL-MCNC: 2.1 NG/DL (ref 0.9–1.7)
TSH SERPL DL<=0.05 MIU/L-ACNC: 0.04 MIU/L (ref 0.27–4.2)

## 2024-07-23 PROCEDURE — 36415 COLL VENOUS BLD VENIPUNCTURE: CPT

## 2024-07-23 PROCEDURE — 77067 SCR MAMMO BI INCL CAD: CPT

## 2024-07-23 PROCEDURE — 84439 ASSAY OF FREE THYROXINE: CPT

## 2024-07-23 PROCEDURE — 3008F BODY MASS INDEX DOCD: CPT | Performed by: OBSTETRICS & GYNECOLOGY

## 2024-07-23 PROCEDURE — 77067 SCR MAMMO BI INCL CAD: CPT | Performed by: RADIOLOGY

## 2024-07-23 PROCEDURE — 99396 PREV VISIT EST AGE 40-64: CPT | Performed by: OBSTETRICS & GYNECOLOGY

## 2024-07-23 PROCEDURE — 77063 BREAST TOMOSYNTHESIS BI: CPT | Performed by: RADIOLOGY

## 2024-07-23 PROCEDURE — 84443 ASSAY THYROID STIM HORMONE: CPT

## 2024-07-23 ASSESSMENT — ENCOUNTER SYMPTOMS
DIZZINESS: 0
ADENOPATHY: 0
UNEXPECTED WEIGHT CHANGE: 0
ABDOMINAL DISTENTION: 0
ACTIVITY CHANGE: 0
CHEST TIGHTNESS: 0
COLOR CHANGE: 0
DIFFICULTY URINATING: 0
DEPRESSION: 0
OCCASIONAL FEELINGS OF UNSTEADINESS: 0
DYSURIA: 0
HEADACHES: 0
SHORTNESS OF BREATH: 0
FATIGUE: 0
ABDOMINAL PAIN: 0
WEAKNESS: 0
LOSS OF SENSATION IN FEET: 0
JOINT SWELLING: 0

## 2024-07-23 ASSESSMENT — LIFESTYLE VARIABLES
HOW OFTEN DO YOU HAVE A DRINK CONTAINING ALCOHOL: NEVER
AUDIT-C TOTAL SCORE: 0
HOW MANY STANDARD DRINKS CONTAINING ALCOHOL DO YOU HAVE ON A TYPICAL DAY: 1 OR 2
HOW OFTEN DO YOU HAVE SIX OR MORE DRINKS ON ONE OCCASION: NEVER
SKIP TO QUESTIONS 9-10: 1
SKIP TO QUESTIONS 9-10: 1
HOW OFTEN DO YOU HAVE SIX OR MORE DRINKS ON ONE OCCASION: NEVER
AUDIT-C TOTAL SCORE: 1
HOW OFTEN DO YOU HAVE A DRINK CONTAINING ALCOHOL: MONTHLY OR LESS
HOW MANY STANDARD DRINKS CONTAINING ALCOHOL DO YOU HAVE ON A TYPICAL DAY: PATIENT DOES NOT DRINK

## 2024-07-23 ASSESSMENT — PATIENT HEALTH QUESTIONNAIRE - PHQ9
1. LITTLE INTEREST OR PLEASURE IN DOING THINGS: NOT AT ALL
SUM OF ALL RESPONSES TO PHQ9 QUESTIONS 1 & 2: 0
2. FEELING DOWN, DEPRESSED OR HOPELESS: NOT AT ALL

## 2024-07-23 ASSESSMENT — PAIN SCALES - GENERAL: PAINLEVEL: 0-NO PAIN

## 2024-07-23 NOTE — PROGRESS NOTES
"Annual  Subjective   Kamla Cota is a 39 y.o. female who is here for a routine exam/thyroid lab and refills..   Complaints:   menses regular; has paused ART attempts d/t failed IVF cycles/expense at  [facility]; looking to get 2nd opinion. Would like 1 more preg/known male factor, and now AMA.   PMHx: Bmi 32.       fibroid uterus; NO cavity impingement per rad.       conceived via IVF through  [facility]. 12/2023 chem preg/sab       Language barrier; husb interprets.       hypothyroid; last TSH 2023 nl  Surg Hx: c&s 04/30/2020; [place] teeth removal 2020  Father: alive, diagnosed with No Signif/Other  Mother: alive, diagnosed with No Signif/Oth  Occupation: homemaker.  Last pap  09/27/2021-NEG,HPV-NEG,09/10/2018-neg .   Mammogram--baseline due   Pelvic US: 10/2022 uterus 13x 6 x 8cm =7 cm fundal, 5 cm rt uterine body, 5 cm JAKOB fibroids   Periods : every month, normal blood loss.   Menarche 11. STDs None. currently sexually active; only 1 lifetime partner.   Total preg  1; Geni; 2020. C section(s)    Review of Systems   Constitutional:  Negative for activity change, fatigue and unexpected weight change.   Respiratory:  Negative for chest tightness and shortness of breath.    Cardiovascular:  Negative for chest pain and leg swelling.   Gastrointestinal:  Negative for abdominal distention and abdominal pain.   Genitourinary:  Negative for difficulty urinating, dysuria, genital sores, pelvic pain, vaginal bleeding, vaginal discharge and vaginal pain.   Musculoskeletal:  Negative for gait problem and joint swelling.   Skin:  Negative for color change and rash.   Neurological:  Negative for dizziness, weakness and headaches.   Hematological:  Negative for adenopathy.   Objective Visit Vitals  /72   Ht 1.575 m (5' 2\")   Wt 81.2 kg (179 lb)   LMP 07/01/2024 (Exact Date)   BMI 32.74 kg/m²   OB Status Having periods   Smoking Status Never   BSA 1.88 m²       General:   Alert and oriented, in no acute distress   Neck: Supple. No " visible thyromegaly.    Breast/Axilla: Normal to palpation bilaterally without masses, skin changes, or nipple discharge.    Abdomen: Soft, non-tender, without masses or organomegaly   Vulva: Normal architecture without erythema, masses, or lesions.    Vagina: Normal mucosa without lesions, masses, or atrophy. No abnormal vaginal discharge.    Cervix: Normal without masses, lesions, or signs of cervicitis   Uterus: Normal, mobile,  mildly enlarged  fibroid uterus   Adnexa: No palpable  masses or tenderness; exam limited by both uterine bulk/bmi.   Pelvic Floor normal   Psych Normal affect. Normal mood.    Assessment/Plan   Encounter Diagnoses   Name Primary?    Visit for gynecologic examination; grossly benign breast/gyn exams. Yes    Encounter for screening mammogram for malignant neoplasm of breast; baseline eval due/prior to further ART.     Fibroid; stable sz on exam. Serial ultrasounds neg for any cavity impingement.      Acquired hypothyroidism; due for lab to ensure proper dose.     Courtney Daugherty MD

## 2024-07-24 DIAGNOSIS — E03.9 ACQUIRED HYPOTHYROIDISM: ICD-10-CM

## 2024-07-24 RX ORDER — LEVOTHYROXINE SODIUM 75 UG/1
75 TABLET ORAL
Qty: 90 TABLET | Refills: 3 | Status: SHIPPED | OUTPATIENT
Start: 2024-07-24

## 2025-04-11 DIAGNOSIS — E03.9 ACQUIRED HYPOTHYROIDISM: ICD-10-CM

## 2025-04-11 RX ORDER — LEVOTHYROXINE SODIUM 75 UG/1
TABLET ORAL
Qty: 90 TABLET | Refills: 3 | Status: SHIPPED | OUTPATIENT
Start: 2025-04-11